# Patient Record
Sex: MALE | Race: NATIVE HAWAIIAN OR OTHER PACIFIC ISLANDER | NOT HISPANIC OR LATINO | Employment: FULL TIME | URBAN - METROPOLITAN AREA
[De-identification: names, ages, dates, MRNs, and addresses within clinical notes are randomized per-mention and may not be internally consistent; named-entity substitution may affect disease eponyms.]

---

## 2022-01-18 ENCOUNTER — TELEPHONE (OUTPATIENT)
Dept: SCHEDULING | Facility: IMAGING CENTER | Age: 52
End: 2022-01-18

## 2022-01-27 ENCOUNTER — OFFICE VISIT (OUTPATIENT)
Dept: MEDICAL GROUP | Facility: PHYSICIAN GROUP | Age: 52
End: 2022-01-27
Payer: COMMERCIAL

## 2022-01-27 VITALS
TEMPERATURE: 98 F | HEART RATE: 63 BPM | HEIGHT: 69 IN | BODY MASS INDEX: 30.07 KG/M2 | SYSTOLIC BLOOD PRESSURE: 118 MMHG | WEIGHT: 203 LBS | RESPIRATION RATE: 20 BRPM | DIASTOLIC BLOOD PRESSURE: 78 MMHG | OXYGEN SATURATION: 96 %

## 2022-01-27 DIAGNOSIS — M25.512 CHRONIC PAIN OF BOTH SHOULDERS: ICD-10-CM

## 2022-01-27 DIAGNOSIS — R06.83 SNORING: ICD-10-CM

## 2022-01-27 DIAGNOSIS — G89.29 CHRONIC PAIN OF BOTH SHOULDERS: ICD-10-CM

## 2022-01-27 DIAGNOSIS — Z76.89 ESTABLISHING CARE WITH NEW DOCTOR, ENCOUNTER FOR: ICD-10-CM

## 2022-01-27 DIAGNOSIS — M25.511 CHRONIC PAIN OF BOTH SHOULDERS: ICD-10-CM

## 2022-01-27 DIAGNOSIS — D75.1 POLYCYTHEMIA: ICD-10-CM

## 2022-01-27 PROCEDURE — 99203 OFFICE O/P NEW LOW 30 MIN: CPT | Performed by: FAMILY MEDICINE

## 2022-01-27 ASSESSMENT — ENCOUNTER SYMPTOMS
SHORTNESS OF BREATH: 0
CHILLS: 0
VOMITING: 0
HEADACHES: 0
DOUBLE VISION: 0
PALPITATIONS: 0
FEVER: 0
SORE THROAT: 0
NAUSEA: 0
MYALGIAS: 0
DIZZINESS: 0
COUGH: 0
BLURRED VISION: 0

## 2022-01-27 ASSESSMENT — PATIENT HEALTH QUESTIONNAIRE - PHQ9: CLINICAL INTERPRETATION OF PHQ2 SCORE: 0

## 2022-01-27 NOTE — LETTER
[email or web address]  Eugene Parisi M.D.  [address]  [place] [address]  Fax: [phone]   Authorization for Release/Disclosure of   Protected Health Information   Name: REY BENSON : 1970 SSN: [id number]   Address: [place] [address] Phone:    [phone] (home) [phone] (work)   I authorize the entity listed below to release/disclose the PHI below to:   [email or web address] M.D. and Eugene Parisi M.D.   Provider or Entity Name:  [facility]    Address   City, [facility], [facility]   Phone:      Fax:     Reason for request: continuity of care   Information to be released:    [  ] LAST COLONOSCOPY,  including any PATH REPORT and follow-up  [  ] LAST FIT/COLOGUARD RESULT [  ] LAST DEXA  [  ] LAST MAMMOGRAM  [  ] LAST PAP  [  ] LAST LABS [  ] RETINA EXAM REPORT  [  ] IMMUNIZATION RECORDS  [  ] Release all info      [  ] Check here and initial the line next to each item to release ALL health information INCLUDING  _____ Care and treatment for drug and / or alcohol abuse  _____ HIV testing, infection status, or AIDS  _____ Genetic Testing    DATES OF SERVICE OR TIME PERIOD TO BE DISCLOSED: _____________  I understand and acknowledge that:  * This Authorization may be revoked at any time by you in writing, except if your health information has already been used or disclosed.  * Your health information that will be used or disclosed as a result of you signing this authorization could be re-disclosed by the recipient. If this occurs, your re-disclosed health information may no longer be protected by State or Federal laws.  * You may refuse to sign this Authorization. Your refusal will not affect your ability to obtain treatment.  * This Authorization becomes effective upon signing and will  on (date) __________.      If no date is indicated, this Authorization will  one (1) year from the signature date.    Name: Rey Benson    Signature:   Date:      1/27/2022       PLEASE FAX REQUESTED RECORDS BACK TO: [phone]

## 2022-01-27 NOTE — ASSESSMENT & PLAN NOTE
New patient to est care  With labs from work eval  hct elevated  Chol elevated    Needs monitoring

## 2022-01-27 NOTE — PROGRESS NOTES
"Subjective:     CC:  Diagnoses of Chronic pain of both shoulders, Polycythemia, Snoring, and Establishing care with new doctor, encounter for were pertinent to this visit.    HISTORY OF THE PRESENT ILLNESS: Patient is a 51 y.o. male. This pleasant patient is here today to establish care and discuss      1) est care  2) STOP BANG 5  3) recent lab work review  4) asthma controlled  5) chronic shoulder pain s/p surgery years ago  Able to gym, doesn't load heavy  Can do pull ups no kip ups          Problem   Chronic Pain of Both Shoulders   Polycythemia   Snoring   Establishing Care With New Doctor, Encounter for   Allergic Rhinitis (Resolved)   Asthma (Resolved)       Current Outpatient Medications Ordered in Epic   Medication Sig Dispense Refill   • fexofenadine (ALLEGRA) 60 MG TABS Take 180 mg by mouth every day.       No current [facility]-ordered facility-administered medications on file.     ROS:   Review of Systems   Constitutional: Negative for chills and fever.   HENT: Negative for ear pain and sore throat.    Eyes: Negative for blurred vision and double vision.   Respiratory: Negative for cough and shortness of breath.    Cardiovascular: Negative for chest pain and palpitations.   Gastrointestinal: Negative for nausea and vomiting.   Genitourinary: Negative for dysuria and hematuria.   Musculoskeletal: Positive for joint pain. Negative for myalgias.   Neurological: Negative for dizziness and headaches.         Objective:     Exam: /78 (BP Location: Left arm, Patient Position: Sitting, BP Cuff Size: Adult)   Pulse 63   Temp 36.7 °C (98 °F) (Temporal)   Resp 20   Ht 1.753 m (5' 9\")   Wt 92.1 kg (203 lb)   SpO2 96%  Body mass index is 29.98 kg/m².    Physical Exam  Constitutional:       General: He is not in acute distress.     Appearance: Normal appearance. He is not ill-appearing or toxic-appearing.   HENT:      Head: Normocephalic and atraumatic.   Eyes:      General: No scleral icterus.        Right " eye: No discharge.         Left eye: No discharge.      Extraocular Movements: Extraocular movements intact.      Conjunctiva/sclera: Conjunctivae normal.      Pupils: Pupils are equal, round, and reactive to light.   Cardiovascular:      Rate and Rhythm: Normal rate and regular rhythm.      Pulses: Normal pulses.      Heart sounds: Normal heart sounds. No murmur heard.      Pulmonary:      Effort: Pulmonary effort is normal. No respiratory distress.      Breath sounds: Normal breath sounds.   Abdominal:      General: There is no distension.      Tenderness: There is no abdominal tenderness.   Musculoskeletal:         General: Normal range of motion.   Neurological:      Mental Status: He is alert.      Coordination: Coordination normal.      Gait: Gait normal.         Assessment & Plan:   51 y.o. male with the following -    Problem List Items Addressed This Visit     Chronic pain of both shoulders     Chronic, R prior surgery  Able to do pull ups but pain returns   Would like to see if body is compensating and specific exercises he should be doing   F/u with sports men         Relevant Orders    Referral to Sports Medicine    Polycythemia     Will repeat and monitor  Likely from melissa?  No T supplements         Relevant Orders    Referral to Pulmonary and Sleep Medicine    Snoring     STOP BANG 5  May be candidate for cpap  Elevated HCT prompted stopbang with hx of snoring  F/u results by sleep med         Relevant Orders    Referral to Pulmonary and Sleep Medicine    Establishing care with new doctor, encounter for     New patient to est care  With labs from work eval  hct elevated  Chol elevated    Needs monitoring             Return in about 1 year (around 1/27/2023), or if symptoms worsen or fail to improve, for f/u labs.    Please note that this dictation was created using voice recognition software. I have made every reasonable attempt to correct obvious errors, but I expect that there are errors of grammar  and possibly content that I did not discover before finalizing the note.

## 2022-01-27 NOTE — ASSESSMENT & PLAN NOTE
STOP BANG 5  May be candidate for cpap  Elevated HCT prompted stopbang with hx of snoring  F/u results by sleep med

## 2022-01-27 NOTE — ASSESSMENT & PLAN NOTE
Chronic, R prior surgery  Able to do pull ups but pain returns   Would like to see if body is compensating and specific exercises he should be doing   F/u with sports men

## 2022-02-08 ENCOUNTER — APPOINTMENT (OUTPATIENT)
Dept: RADIOLOGY | Facility: IMAGING CENTER | Age: 52
End: 2022-02-08
Attending: FAMILY MEDICINE
Payer: COMMERCIAL

## 2022-02-08 ENCOUNTER — OFFICE VISIT (OUTPATIENT)
Dept: SPORTS MEDICINE | Facility: CLINIC | Age: 52
End: 2022-02-08
Payer: COMMERCIAL

## 2022-02-08 VITALS
OXYGEN SATURATION: 93 % | RESPIRATION RATE: 18 BRPM | BODY MASS INDEX: 30.07 KG/M2 | HEIGHT: 69 IN | HEART RATE: 82 BPM | TEMPERATURE: 98.4 F | SYSTOLIC BLOOD PRESSURE: 118 MMHG | DIASTOLIC BLOOD PRESSURE: 74 MMHG | WEIGHT: 203 LBS

## 2022-02-08 DIAGNOSIS — R29.898 CLICKING SHOULDER: ICD-10-CM

## 2022-02-08 DIAGNOSIS — M24.811 INTERNAL DERANGEMENT OF RIGHT SHOULDER: ICD-10-CM

## 2022-02-08 DIAGNOSIS — G89.29 CHRONIC RIGHT SHOULDER PAIN: ICD-10-CM

## 2022-02-08 DIAGNOSIS — M25.511 CHRONIC RIGHT SHOULDER PAIN: ICD-10-CM

## 2022-02-08 DIAGNOSIS — M25.311 INSTABILITY OF RIGHT SHOULDER JOINT: ICD-10-CM

## 2022-02-08 DIAGNOSIS — M24.411 RECURRENT SUBLUXATION OF SHOULDER, RIGHT: ICD-10-CM

## 2022-02-08 PROCEDURE — 99205 OFFICE O/P NEW HI 60 MIN: CPT | Performed by: FAMILY MEDICINE

## 2022-02-08 PROCEDURE — 73030 X-RAY EXAM OF SHOULDER: CPT | Mod: TC,RT | Performed by: RADIOLOGY

## 2022-02-08 NOTE — Clinical Note
Edson Knight,  Thank you for referring Rey to the sports medicine clinic for his shoulder pain.  I am concerned that his labral tear has recurred.  I have ordered a MRI with arthrogram for better assessment of the labrum.  More to come,...  Hope you are well!  L

## 2022-02-08 NOTE — PROGRESS NOTES
Chief Complaint   Patient presents with   • Shoulder Pain     Referral from UC/ R shoulder pain        CHIEF COMPLAINT:  Rey Benson male presenting at the request of Eugene Parisi M.D. for evaluation of Shoulder pain.     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  He has difficulty moving the arm and has to use his contralateral hand to manipulate the RIGHT arm when he feels a sudden sharp painful pop before he can move the shoulder again which can be painful  Worse at night, particularly if he rolls onto his RIGHT side    Active duty   CrossFit, weightlifting, bike riding, surfing, snowboarding, tennis    REVIEW OF SYSTEMS  No Nausea, No Vomiting, No Chest Pain, No Shortness of Breath, No Dizziness, No Headache    PAST MEDICAL HISTORY:   History reviewed. No pertinent past medical history.    PMH:  has a past medical history of Allergic rhinitis (4/7/2011).  MEDS:   Current Outpatient Medications:   •  fexofenadine (ALLEGRA) 60 MG TABS, Take 180 mg by mouth every day., Disp: , Rfl:   ALLERGIES:   Allergies   Allergen Reactions   • Pcn [Penicillins]      SURGHX:   Past Surgical History:   Procedure Laterality Date   • ARTHROPLASTY      Rt shoulder 8/2016   •  "KNEE ARTHROSCOPY      Left     SOCHX:  reports that he has never smoked. He has never used smokeless tobacco. He reports current alcohol use. He reports that he does not use drugs.  FH: Family history was reviewed, no pertinent findings to report     PHYSICAL EXAM:  /74 (BP Location: Left arm, Patient Position: Sitting, BP Cuff Size: Adult)   Pulse 82   Temp 36.9 °C (98.4 °F) (Temporal)   Resp 18   Ht 1.753 m (5' 9\")   Wt 92.1 kg (203 lb)   SpO2 93%   BMI 29.98 kg/m²      well-developed, well-nourished in no apparent distress, alert and oriented x 3.  Gait: normal    Cervical spine:  Range of motion Slightly limited with Lateral rotation and Markedly limited with Extension  Spurling's testing is NEGATIVE but there is some local cervical spine discomfort  Cervical spine tenderness NEGATIVE    Strength testing:     Deltoid, bilateral 5/5  Bicep, bilateral 5/5  Tricep, bilateral 5/5  Wrist Extension, bilateral 5/5  Wrist Flexion, bilateral 5/5  Finger Abduction, bilateral 5/5    Sensation:  INTACT Bilaterally    Reflexes:   Biceps: R 2+/L 2+  Triceps: R 2+/L  2+  Brachial radialis R 2+/L  2+  Bullock's testing is NEGATIVE  The arms are otherwise neurovascularly intact     Shoulder Exam:    RIGHT Shoulder:  No visible swelling   Range of motion DIMINISHED with pain particularly with abduction and internal rotation  Tenderness: Non-tender  Empty Can Testing 5/5 with pain  Internal Rotation 5/5  External Rotation 5/5 with pain  Lift Off Testing, unable to perform on the RIGHT compared to the left with NORMAL 5/5 belly press test  Impingement testing Morales  POSITIVE  Neer's testing POSITIVE  Apprehension testing POSITIVE  Relocation testing POSITIVE  Bhatti's Testing Equivocal  Grind Testing POSITIVE with PAINFUL clicking with posterior shoulder joint compression      LEFT Shoulder:  No visible swelling   Range of motion INTACT  Tenderness: Non-tender  Empty Can Testing 5/5  Internal Rotation 5/5  External " Rotation 5/5  Lift Off Testing 5/5  Impingement testing Morales  NEGATIVE  Neer's testing NEGATIVE  Apprehension testing NEGATIVE  Relocation testing NEGATIVE  Bhatti's Testing NEGATIVE  Grind Testing NEGATIVE    Additional Findings: None    1. Chronic right shoulder pain  DX-SHOULDER 2+ RIGHT    MR-SHOULDER-WITH RIGHT    DX-ARTHROGRAM-SHOULDER RIGHT   2. Instability of right shoulder joint  MR-SHOULDER-WITH RIGHT    DX-ARTHROGRAM-SHOULDER RIGHT   3. Clicking shoulder  MR-SHOULDER-WITH RIGHT    DX-ARTHROGRAM-SHOULDER RIGHT   4. Internal derangement of right shoulder  MR-SHOULDER-WITH RIGHT    DX-ARTHROGRAM-SHOULDER RIGHT   5. Recurrent subluxation of shoulder, right  MR-SHOULDER-WITH RIGHT    DX-ARTHROGRAM-SHOULDER RIGHT     present for 6 month  Pain is at the deltoid region, RTC distribution,and GH joint  Quality is sharp, pressure    He does recall after his 2018 surgery, in 2019 after his recovery he was bench pressing heavy weight (315 pounds) and he had sudden sharp pain in the RIGHT shoulder.  He recovered from that injury without issue and has continued to be active WITHOUT doing any more bench pressing and has only stuck with light weights and light workouts    closed work comp case regarding RIGHT shoulder injury    RIGHT shoulder exam is consistent with a labral tear  Patient did have a labral repair by Dr. Wray back in 2018 (supraspinatus and labrum)  I suspect he may have retorn his labrum given his exam presentation and history of mechanical symptoms at night where he has to physically use his other arm to pull his shoulder into place with a painful pop  It sounds like a recurrent subluxation at the least    Check shoulder x-ray, if normal or nondiagnostic, consider MR arthrogram R shoulder for further evaluation of the labrum    No follow-ups on file.         2/8/2022 4:45 PM     HISTORY/REASON FOR EXAM:  Atraumatic Pain/Swelling/Deformity.  Chronic RIGHT shoulder pain.     TECHNIQUE/EXAM  DESCRIPTION AND NUMBER OF VIEWS:  3 views of the RIGHT shoulder.     COMPARISON: None     FINDINGS:  Clavicle is intact.  AC joint is preserved.  Visualized proximal humerus is intact and normally located.  Visualized RIGHT upper chest is unremarkable.  Minimal osteophyte formation at the margin of the glenoid.     IMPRESSION:     1.  Minimal degenerative change of RIGHT shoulder.  2.  No fracture or dislocation.             Exam Ended: 02/08/22  4:51 PM Last Resulted: 02/08/22  4:54 PM              Interpreted in the office today     Thank you Eugene Parisi M.D. allowing me to participate in caring for your patient    Greater than 60 minutes was spent reviewing patient history, discussing current issue, physical examination, reviewing results, communicating with consulting and prior care providers and documenting the visit.

## 2022-03-15 ASSESSMENT — ENCOUNTER SYMPTOMS: SLEEP DISTURBANCE: 0

## 2022-03-16 ENCOUNTER — OFFICE VISIT (OUTPATIENT)
Dept: SLEEP MEDICINE | Facility: MEDICAL CENTER | Age: 52
End: 2022-03-16
Payer: COMMERCIAL

## 2022-03-16 VITALS
OXYGEN SATURATION: 95 % | HEART RATE: 80 BPM | RESPIRATION RATE: 16 BRPM | SYSTOLIC BLOOD PRESSURE: 122 MMHG | DIASTOLIC BLOOD PRESSURE: 72 MMHG | HEIGHT: 69 IN | WEIGHT: 202.9 LBS | BODY MASS INDEX: 30.05 KG/M2

## 2022-03-16 DIAGNOSIS — G47.30 SLEEP DISORDER BREATHING: ICD-10-CM

## 2022-03-16 PROCEDURE — 99204 OFFICE O/P NEW MOD 45 MIN: CPT | Performed by: FAMILY MEDICINE

## 2022-03-16 RX ORDER — ZOLPIDEM TARTRATE 5 MG/1
5 TABLET ORAL NIGHTLY PRN
Qty: 2 TABLET | Refills: 0 | Status: SHIPPED | OUTPATIENT
Start: 2022-03-16 | End: 2022-03-17

## 2022-03-16 ASSESSMENT — PATIENT HEALTH QUESTIONNAIRE - PHQ9
5. POOR APPETITE OR OVEREATING: 0 - NOT AT ALL
SUM OF ALL RESPONSES TO PHQ QUESTIONS 1-9: 5
CLINICAL INTERPRETATION OF PHQ2 SCORE: 3

## 2022-03-16 NOTE — PROGRESS NOTES
"      [facility]  Consult Note     Date: 3/16/2022 / Time: 2:08 PM    Patient ID:   Name:             Rey Benson     YOB: 1970  Age:                 51 y.o.  male   MRN:               [id number]      Thank you for requesting a sleep medicine consultation on Rey Benson at the sleep center. He presents today with the chief complaints of snoring and pause in breathing during sleep.  He is referred by Dr. Parisi for evaluation and treatment of sleep disorder breathing.     HISTORY OF PRESENT ILLNESS:       Works as a  and works 24-hour shifts. At night,  Rey Benson goes to bed around 9:30-10 pm on weekdays and around 11 pm on the weekends. He gets out of bed at 7 am on weekdays and at 7 am on the weekends.  He averages about 8 hrs of sleep on a good night and 5-6 hrs on a bad night.  The bad nights are on the days he is working overnight.. He falls asleep within 15 minutes.He is aware of snoring and breathing pauses in sleep.  He  denies any symptoms of restless legs syndrome such as an \"urge to move\"  He  legs in the evening or bedtime. He  denies any symptoms of narcolepsy such as sleep paralysis or cataplexy, or any symptoms to suggest parasomnias such as sleep walking or acting out of dreams. He  has not used any medications for the sleep problem.Overall, he does finds his sleep refreshing. In terms of  excessive daytime sleepiness,he denies of sleepiness while  at work,reading or watching TV and while driving. Kramer sleepiness scale score is 8/24.He  rarely takes a regular nap. He drinks about 1-3 caffeinated beverages per day.      REVIEW OF SYSTEMS:       Constitutional: Denies fevers, Denies weight changes  Eyes: Denies changes in vision, no eye pain  Ears/Nose/Throat/Mouth: Denies nasal congestion or sore throat   Cardiovascular: Denies chest pain or palpitations   Respiratory: Denies shortness of breath , Denies cough  Gastrointestinal/Hepatic: Denies " "abdominal pain, nausea, vomiting, diarrhea, constipation or GI bleeding   Genitourinary: Denies bladder dysfunction, dysuria or frequency  Musculoskeletal/Rheum: Denies  joint pain and swelling   Skin/Breast: Denies rash  Neurological: Denies headache, confusion, memory loss or focal weakness/parasthesias  Psychiatric: denies mood disorder     Comprehensive review of systems form is reviewed with the patient and is attached in the EMR.     PMH:  has a past medical history of Allergic rhinitis (4/7/2011), Apnea, sleep, and Snoring.  MEDS:   Current Outpatient Medications:   •  fexofenadine (ALLEGRA) 60 MG TABS, Take 180 mg by mouth every day., Disp: , Rfl:   ALLERGIES:   Allergies   Allergen Reactions   • Pcn [Penicillins]      SURGHX:   Past Surgical History:   Procedure Laterality Date   • ARTHROPLASTY      Rt shoulder 8/2016   • ARTHROSCOPY, KNEE     • KNEE ARTHROSCOPY      Left   • SINUSCOPE       SOCHX:  reports that he has never smoked. He has never used smokeless tobacco. He reports current alcohol use of about 1.2 oz of alcohol per week. He reports that he does not use drugs.   FH:   Family History   Problem Relation Age of Onset   • Diabetes Mother    • Hypertension Mother    • Alzheimer's Disease Mother    • Sleep Apnea Neg Hx        Physical Exam:  Vitals/ General Appearance:   Weight/BMI: Body mass index is 29.96 kg/m².  /72 (BP Location: Left arm, Patient Position: Sitting, BP Cuff Size: Adult)   Pulse 80   Resp 16   Ht 1.753 m (5' 9\")   Wt 92 kg (202 lb 14.4 oz)   SpO2 95%   Vitals:    03/16/22 1408   BP: 122/72   BP Location: Left arm   Patient Position: Sitting   BP Cuff Size: Adult   Pulse: 80   Resp: 16   SpO2: 95%   Weight: 92 kg (202 lb 14.4 oz)   Height: 1.753 m (5' 9\")           Constitutional: Alert, no distress, well-groomed.  Skin: No rashes in visible areas.  Eye: Round. Conjunctiva clear, lids normal. No icterus.   ENMT: Lips pink without lesions, good dentition, moist mucous " membranes. Phonation normal.  Neck: No masses, no thyromegaly. Moves freely without pain.  CV: Pulse as reported by patient  Respiratory: Unlabored respiratory effort, no cough or audible wheeze  Psych: Alert and oriented x3, normal affect and mood.   INVESTIGATIONS:       ASSESSMENT AND PLAN     1. He  has symptoms of Obstructive Sleep Apnea (JOHNSON).    We have discussed diagnostic options including in-laboratory, attended polysomnography and home sleep testing. We have also discussed treatment options including airway pressurization, reconstructive otolaryngologic surgery, dental appliances and weight management.       Subsequently,treatment options will be discussed based on the diagnostic study. Meanwhile, He is urged to avoid supine sleep, weight gain and alcoholic beverages since all of these can worsen JOHNSON. He is cautioned against drowsy driving. If He feels sleepy while driving, He must pull over for a break/nap, rather than persist on the road, in the interest of He own safety and that of others on the road.    Plan  -  He  will be scheduled for an overnight PSG to assess sleep related breathing disorder.    2.  Regarding treatment of other past medical problems and general health maintenance,  He is urged to follow up with PCP.

## 2022-04-12 ENCOUNTER — TELEPHONE (OUTPATIENT)
Dept: MEDICAL GROUP | Facility: PHYSICIAN GROUP | Age: 52
End: 2022-04-12
Payer: COMMERCIAL

## 2022-04-12 DIAGNOSIS — M25.521 RIGHT ELBOW PAIN: ICD-10-CM

## 2022-04-12 RX ORDER — CLINDAMYCIN HYDROCHLORIDE 300 MG/1
300 CAPSULE ORAL 3 TIMES DAILY
Qty: 15 CAPSULE | Refills: 0 | Status: SHIPPED | OUTPATIENT
Start: 2022-04-12 | End: 2022-05-06

## 2022-04-12 NOTE — TELEPHONE ENCOUNTER
VOICEMAIL  1. Caller Name: Rey Benson                      Call Back Number:[phone] (home)     2. Message: Pt called, had a question about his elbow.  Pt stated he had to have a pressure check on the joint capsule in his elbow. He has sutures in his elbow and stated the elbow is very swollen.  He would like to know if PCP will recommend him using a compression wrap on it or just ice it.  Pt stated he has an appt with Eugene Parisi M.D. on Friday Am, but would like to know what to do in the meantime.        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

## 2022-04-13 ENCOUNTER — SLEEP STUDY (OUTPATIENT)
Dept: SLEEP MEDICINE | Facility: MEDICAL CENTER | Age: 52
End: 2022-04-13
Attending: FAMILY MEDICINE
Payer: COMMERCIAL

## 2022-04-13 DIAGNOSIS — G47.30 SLEEP DISORDER BREATHING: ICD-10-CM

## 2022-04-13 PROCEDURE — 95811 POLYSOM 6/>YRS CPAP 4/> PARM: CPT | Performed by: FAMILY MEDICINE

## 2022-04-14 NOTE — PROCEDURES
MONTAGE: Standard  STUDY TYPE: Split Night  RECORDING TECHNIQUE:   After the scalp was prepared, gold plated electrodes were applied to the scalp according to the International 10-20 System. EEG (electroencephalogram) was continuously monitored from the O1-M2, O2-M1, C3-M2, C4-M1, F3-M2, and F4-M1. EOGs (electrooculograms) were monitored by electrodes placed at the left and right outer canthi. Chin EMG (electromyogram) was monitored by electrodes placed on the mentalis and sub-mentalis muscles. Nasal and oral airflow were monitored using a triple port thermocouple as well as oronasal pressure transducer. Respiratory effort was measured by inductive plethysmography technology employing abdominal and thoracic belts. Blood oxygen saturation and pulse were monitored by pulse oximetry. Heart rhythm was monitored by surface electrocardiogram. Leg EMG was studied using surface electrodes placed on left and right anterior tibialis. A microphone was used to monitor tracheal sounds and snoring. Body position was monitored and documented by technician observation.   SCORING CRITERIA:   A modification of the AASM manual for scoring of sleep and associated events was used. Obstructive apneas were scored by cessation of airflow for at least 10 seconds with continuing respiratory effort. Central apneas were scored by cessation of airflow for at least 10 seconds with no respiratory effort. Hypopneas were scored by a 30% or more reduction in airflow for at least 10 seconds accompanied by arterial oxygen desaturation of 3% or an arousal. For CMS (Medicare) patients, per AASM rule 1B, hypopneas are scored by 30% with mild reduction in airflow for at least 10 seconds accompanied by arterial saturation decreased at 4%.  DIAGNOSTIC  Study start time was 09:24:07 PM. Diagnostic recording time was 201 minutes with a total sleep time of 122 minutes resulting in a sleep efficiency of 60.79%%. Sleep latency from the start of the study was 55  minutes and the latency from sleep to REM was 88 minutes. In total,15 arousals were scored for an arousal index of 7.3.  Respiratory:  There were a total of 35 apneas consisting of 35 obstructive apneas, 0 mixed apneas, and 0 central apneas. A total of 90 hypopneas were scored. The apnea index was 17.14 per hour and the hypopnea index was 44.08 per hour resulting in an overall AHI of 61.22. AHI during rem was 75.3 and AHI while supine was 75.29.  Oximetry:  There was a mean oxygen saturation of 91.0%. The minimum oxygen saturation during NREM sleep was80.0% and in REM was 76.0. Time spent during sleep with oxygen saturations <88% was 16.3 minutes.   Cardiac:  The highest heart rate seen while awake was 107 BPM while the highest heart rate during sleep was 69 BPM with an average sleeping heart rate of 52 BPM.  Limb Movements:  There were a total of 193 PLMs during sleep, which resulted in a PLM index of 94.5. There were 10 PLMs associated with arousals which resulted in a PLMS arousal index of 4.9.  TREATMENT:  Treatment recording time was 5h 45.5m (345 minutes) with a total sleep time of 4h 25.0m (265 minutes) resulting in a sleep efficiency of 76.7%. Sleep latency from the start of treatment was 04 minutes and REM latency from sleep onset was 2h 3.5m. The patient had 33 arousals in total for an arousal index of 7.5.  Respiratory:   There were 3 apneas in total consisting of 3 obstructive apneas, 0 central apneas, and 0 mixed apneas for an apnea index of 0.68. The patient had 38 hypopneas in total, which resulted in a hypopnea index of 8.60. The overall AHI was 9.28, with a REM AHI of 11.43, and a supine AHI of 12.14.   Oximetry:  The mean SaO2 during treatment was 92.0%. The minimum oxygen saturation in NREM was86.0 % and in REM was 90.0%. Patient spent 1.5 minutes of TST with SaO2 <88%.  Cardiac:  The highest heart rate during sleep was 65 BPM with an average sleeping heart rate of 52BPM.  Limb Movements:  There  were a total of 94 PLMS during titration sleep time that resulted in an index of 21.3. There were 6 PLMS associated with arousals. This resulted in a PLM arousal index of 1.4.  Titration: CPAP was tried from 6 cm H2O to 10 cm H2O.    CPAP Titration:  The PAP titration was initiated with CPAP 6 cm of water and the pressure which was slowly titrated up in an attempt to eliminate sleep disordered breathing and snoring. The final pressure tested during the study was CPAP 10 cm water. The best tolerated pressure was CPAP 8 cm. At this final pressure the patient was observed in nonsupine REM sleep stage. The apnea hypopnea index improved to 4.57 per hour and O2 danny 87%. The average O2 stauration was 92%. He spent 1 min of sleep time below 88% O2 saturation. Snoring was resolved.The patient utilized medium dreamwear FFM with heated humidification. The PAP was well-tolerated and there were minimal air leaks. Supplemental oxygen was not required.    Impression:  1.  She obstructive sleep apnea with AHI of 61.2/hr and O2 danny 76 %. Due to severity of the disease he met the split study protocol. The titration started with CPAP 6 cm and the best tolerated was CPAP 8 cm. The AHI improved to 4.57/hr with improved O2 danny of 87% and average O2 saturation of 92 %.     Recommendations:  I recommend CPAP 8 cm with medium dreamwear FFM. Recommended 30 day compliance download to assess the efficacy of the recommended pressure and compliance for further outpatient monitoring and management of CPAP therapy. In some cases alternative treatment options may be proven effective in resolving sleep apnea. These options include upper airway surgery, the use of a dental orthotic, weight loss orpositional therapy. Clinical correlation is required. In general patients with sleep apnea are advised to avoid alcohol, sedatives and not to operate a motor vehicle while drowsy.  Untreated sleep apnea increases the risk for cardiovascular and  neurovascular disease.

## 2022-04-15 ENCOUNTER — OFFICE VISIT (OUTPATIENT)
Dept: MEDICAL GROUP | Facility: PHYSICIAN GROUP | Age: 52
End: 2022-04-15
Payer: COMMERCIAL

## 2022-04-15 VITALS
TEMPERATURE: 97 F | BODY MASS INDEX: 30.07 KG/M2 | HEART RATE: 73 BPM | WEIGHT: 203 LBS | RESPIRATION RATE: 16 BRPM | SYSTOLIC BLOOD PRESSURE: 110 MMHG | DIASTOLIC BLOOD PRESSURE: 80 MMHG | HEIGHT: 69 IN | OXYGEN SATURATION: 96 %

## 2022-04-15 DIAGNOSIS — L98.9 SKIN LESION: ICD-10-CM

## 2022-04-15 PROCEDURE — 99212 OFFICE O/P EST SF 10 MIN: CPT | Performed by: FAMILY MEDICINE

## 2022-04-15 ASSESSMENT — ENCOUNTER SYMPTOMS
SENSORY CHANGE: 0
FOCAL WEAKNESS: 0
CHILLS: 0
TINGLING: 0
FEVER: 0

## 2022-04-15 NOTE — PROGRESS NOTES
"Subjective:     CC: skin check, suture removal    HPI:   Rey presents today with     1) day 6 R elbow lac  1 stitch  \"horizontal mattress stitch\"   Swelling and redness improved  clinda worked will    Problem   Skin Lesion       Current Outpatient Medications Ordered in Epic   Medication Sig Dispense Refill   • mupirocin (BACTROBAN) 2 % Ointment Apply 1 Application topically 2 times a day. 22 g 0   • clindamycin (CLEOCIN) 300 MG Cap Take 1 Capsule by mouth 3 times a day. 15 Capsule 0     No current Epic-ordered facility-administered medications on file.     ROS:  Review of Systems   Constitutional: Negative for chills and fever.   Neurological: Negative for tingling, sensory change and focal weakness.       Objective:     Exam:  /80 (BP Location: Left arm, Patient Position: Sitting, BP Cuff Size: Adult)   Pulse 73   Temp 36.1 °C (97 °F) (Temporal)   Resp 16   Ht 1.753 m (5' 9\")   Wt 92.1 kg (203 lb)   SpO2 96%   BMI 29.98 kg/m²  Body mass index is 29.98 kg/m².    Physical Exam  Constitutional:       General: He is not in acute distress.     Appearance: Normal appearance. He is not ill-appearing, toxic-appearing or diaphoretic.   HENT:      Head: Normocephalic and atraumatic.   Eyes:      General: No scleral icterus.        Right eye: No discharge.         Left eye: No discharge.      Extraocular Movements: Extraocular movements intact.      Conjunctiva/sclera: Conjunctivae normal.      Pupils: Pupils are equal, round, and reactive to light.   Musculoskeletal:         General: Tenderness and signs of injury present. No swelling or deformity.   Skin:     Findings: Lesion present.          Neurological:      Mental Status: He is alert.         Assessment & Plan:     51 y.o. male with the following -     Problem List Items Addressed This Visit     Skin lesion     R elbow, healing well,   Topical and po abx x2 days  rtc if concerns         Relevant Medications    mupirocin (BACTROBAN) 2 % Ointment    "     Return if symptoms worsen or fail to improve.    Please note that this dictation was created using voice recognition software. I have made every reasonable attempt to correct obvious errors, but I expect that there are errors of grammar and possibly content that I did not discover before finalizing the note.

## 2022-05-06 ENCOUNTER — OFFICE VISIT (OUTPATIENT)
Dept: SLEEP MEDICINE | Facility: MEDICAL CENTER | Age: 52
End: 2022-05-06
Payer: COMMERCIAL

## 2022-05-06 ENCOUNTER — APPOINTMENT (OUTPATIENT)
Dept: SLEEP MEDICINE | Facility: MEDICAL CENTER | Age: 52
End: 2022-05-06
Payer: COMMERCIAL

## 2022-05-06 VITALS
OXYGEN SATURATION: 96 % | HEART RATE: 85 BPM | HEIGHT: 69 IN | WEIGHT: 202 LBS | BODY MASS INDEX: 29.92 KG/M2 | SYSTOLIC BLOOD PRESSURE: 126 MMHG | DIASTOLIC BLOOD PRESSURE: 72 MMHG

## 2022-05-06 DIAGNOSIS — G47.33 OSA (OBSTRUCTIVE SLEEP APNEA): ICD-10-CM

## 2022-05-06 PROCEDURE — 99213 OFFICE O/P EST LOW 20 MIN: CPT | Performed by: NURSE PRACTITIONER

## 2022-05-06 NOTE — PROGRESS NOTES
Chief Complaint   Patient presents with   • Apnea     JOHNSON-Last seen on 3/16/2022   • Results     sleep study- 4/13/2022       HPI:      Mr. Benson is a 50 y/o male patient who is in today for JOHNSON f/u and sleep study results.  Patient is a  and works 24-hour shifts.  PMH includes polycythemia, JOHNSON, never smoker, chronic bilateral shoulder pain.    He goes to bed between 10:30-11 pm and wakes up at 7-7:30 am. He denies morning headache, but reports snoring.  He does awaken several times so when he sleeps at work but typically does not have trouble going back to sleep.  He does sleep more soundly when at home.  He is planning to retire in October 2022.  He does feel tired during the day and sometimes might have some brain fog or memory issues.  His sleep sometimes also gets disrupted due to shoulder pain for which he did undergo shoulder surgery.  As a  he stays physically active while at work and also tends to work out daily.  He also likes to participate in sports such as mountain biking, dirt biking, snowboarding and surfing.  He denies any new health problems or medications.    Sleep Study History:   PSG split night study from 4/13/22 indicated severe obstructive sleep apnea with AHI of 61.2/hr and O2 danny 76 %. Due to severity of the disease he met the split study protocol. The titration started with CPAP 6 cm and the best tolerated was CPAP 8 cm. The AHI improved to 4.57/hr with improved O2 danny of 87% and average O2 saturation of 92 %. Supplemental oxygen was not required.    ROS:    Constitutional: Denies fevers, Denies weight changes  Eyes: Denies changes in vision, no eye pain  Ears/Nose/Throat/Mouth: Denies nasal congestion or sore throat   Cardiovascular: Denies chest pain or palpitations   Respiratory: Denies shortness of breath , Denies cough  Gastrointestinal/Hepatic: Denies abdominal pain, nausea, vomiting,   Skin/Breast: Denies rash,   Neurological: Denies headache, confusion,    Psychiatric: denies mood disorder   Sleep: + snoring       Past Medical History:   Diagnosis Date   • Allergic rhinitis 4/7/2011   • Apnea, sleep    • Snoring        Past Surgical History:   Procedure Laterality Date   • ARTHROPLASTY      Rt shoulder 8/2016   • ARTHROSCOPY, KNEE     • KNEE ARTHROSCOPY      Left   • SINUSCOPE         Family History   Problem Relation Age of Onset   • Diabetes Mother    • Hypertension Mother    • Alzheimer's Disease Mother    • Sleep Apnea Neg Hx        Social History     Socioeconomic History   • Marital status:      Spouse name: Not on file   • Number of children: Not on file   • Years of education: Not on file   • Highest education level: Not on file   Occupational History   • Not on file   Tobacco Use   • Smoking status: Never Smoker   • Smokeless tobacco: Never Used   Vaping Use   • Vaping Use: Never used   Substance and Sexual Activity   • Alcohol use: Yes     Alcohol/week: 1.2 oz     Types: 1 Glasses of wine, 1 Cans of beer per week     Comment: occasional   • Drug use: No   • Sexual activity: Not on file   Other Topics Concern   • Not on file   Social History Narrative   • Not on file     Social Determinants of Health     Financial Resource Strain: Not on file   Food Insecurity: Not on file   Transportation Needs: Not on file   Physical Activity: Not on file   Stress: Not on file   Social Connections: Not on file   Intimate Partner Violence: Not on file   Housing Stability: Not on file       Allergies as of 05/06/2022 - Reviewed 05/06/2022   Allergen Reaction Noted   • Pcn [penicillins]  02/23/2011        Vitals:  Vitals:    05/06/22 1401   BP: 126/72   Pulse: 85   SpO2: 96%       Current medications as of today   No current outpatient medications on file.     No current facility-administered medications for this visit.         Physical Exam: Limited by COVID-19 precautions.  Appearance: Well developed, well nourished, no acute distress  Eyes: PERRL, EOM intact,  sclera white, conjunctiva moist  Ears: no lesions or deformities  Hearing: grossly intact  Nose: no lesions or deformities  Respiratory effort: no intercostal retractions or use of accessory muscles  Extremities: no cyanosis or edema  Abdomen: soft   Gait and Station: normal  Digits and nails: no clubbing, cyanosis, petechiae or nodes.  Cranial nerves: grossly intact  Skin: no visible rashes, lesions or ulcers noted  Orientation: Oriented to time, person and place  Mood and affect: mood and affect appropriate, normal interaction with examiner  Judgement: Intact    Assessment:  1. JOHNSON (obstructive sleep apnea)  DME CPAP   2. BMI 29.0-29.9,adult           Plan  Discussed the cardiovascular and neuropsychiatric risks of untreated JOHNSON; including but not limited to: HTN, DM, MI, ASCVD, CVA, CHF, traffic accidents.     1.  PSG split night study from 4/13/22 indicated severe obstructive sleep apnea with AHI of 61.2/hr and O2 danny 76 %. Due to severity of the disease he met the split study protocol. The titration started with CPAP 6 cm and the best tolerated was CPAP 8 cm. The AHI improved to 4.57/hr with improved O2 danny of 87% and average O2 saturation of 92 %. Supplemental oxygen was not required.  Recommendation:  Recommend CPAP 8 cm with medium dreamwear FFM.   In some cases alternative treatment options may be proven effective in resolving sleep apnea. These options include upper airway surgery, the use of a dental orthotic, weight loss orpositional therapy.  Patient is advised to avoid the supine position, alcohol 4 hours prior to bedtime, sedatives and opioids as all can exacerbate apnea.    *Patient is amenable to CPAP therapy.  DME order ([facility]) for ResMed CPAP 8 cmH2O, mask (medium dreamwear FFM or MOC) and supplies was placed today. Clean mask and supplies weekly with soap and water, and change supplies per insurance guidelines. Advised patient to use the CPAP every night for more than four hours for optimal  health benefit and to meet the health insurance 70% compliance guideline. Advised patient he may change the mask out if needed within the first 30 days after he receives his equipment.     Patient was informed of [employer] recall and that this may cause a delay up to 5 or 6 months in obtaining a ResMed machine.    2.  Continue to stay active.  If your BMI is 25-29.9 you are overweight. If your BMI is 30 or greater you are obese. To lose weight eat less, move more, or both. Any diet that reduces caloric intake can help with weight loss. Extra weight may reduce your lifespan. Avoid dramatic unsustainable dietary changes that result in the yo-yo effect (down then back up.)  Usually small modifications in diet and exercise are easier to stick with.  3. Sleep hygiene discussed. Recommend keeping a set sleep/wake schedule. Logging enough hours of sleep. Limiting/Avoiding naps. No caffeine after noon and no heavy meals in the evening.   4. Follow up with appropriate healthcare providers for all other medical problems.  5. F/u in 4 months for first compliance, JOHNSON.       RYDER Hall.      This dictation was created using voice recognition software. The accuracy of the dictation is limited to the abilities of the software. I expect there may be some errors of grammar and possibly content.

## 2022-12-09 ENCOUNTER — APPOINTMENT (OUTPATIENT)
Dept: SLEEP MEDICINE | Facility: MEDICAL CENTER | Age: 52
End: 2022-12-09
Payer: COMMERCIAL

## 2023-03-12 SDOH — HEALTH STABILITY: MENTAL HEALTH
STRESS IS WHEN SOMEONE FEELS TENSE, NERVOUS, ANXIOUS, OR CAN'T SLEEP AT NIGHT BECAUSE THEIR MIND IS TROUBLED. HOW STRESSED ARE YOU?: ONLY A LITTLE

## 2023-03-12 SDOH — ECONOMIC STABILITY: HOUSING INSECURITY
IN THE LAST 12 MONTHS, WAS THERE A TIME WHEN YOU DID NOT HAVE A STEADY PLACE TO SLEEP OR SLEPT IN A SHELTER (INCLUDING NOW)?: NO

## 2023-03-12 SDOH — ECONOMIC STABILITY: INCOME INSECURITY: IN THE LAST 12 MONTHS, WAS THERE A TIME WHEN YOU WERE NOT ABLE TO PAY THE MORTGAGE OR RENT ON TIME?: NO

## 2023-03-12 SDOH — ECONOMIC STABILITY: TRANSPORTATION INSECURITY
IN THE PAST 12 MONTHS, HAS THE LACK OF TRANSPORTATION KEPT YOU FROM MEDICAL APPOINTMENTS OR FROM GETTING MEDICATIONS?: NO

## 2023-03-12 SDOH — ECONOMIC STABILITY: FOOD INSECURITY: WITHIN THE PAST 12 MONTHS, THE FOOD YOU BOUGHT JUST DIDN'T LAST AND YOU DIDN'T HAVE MONEY TO GET MORE.: NEVER TRUE

## 2023-03-12 SDOH — ECONOMIC STABILITY: HOUSING INSECURITY: IN THE LAST 12 MONTHS, HOW MANY PLACES HAVE YOU LIVED?: 1

## 2023-03-12 SDOH — ECONOMIC STABILITY: FOOD INSECURITY: WITHIN THE PAST 12 MONTHS, YOU WORRIED THAT YOUR FOOD WOULD RUN OUT BEFORE YOU GOT MONEY TO BUY MORE.: NEVER TRUE

## 2023-03-12 SDOH — HEALTH STABILITY: PHYSICAL HEALTH: ON AVERAGE, HOW MANY MINUTES DO YOU ENGAGE IN EXERCISE AT THIS LEVEL?: 60 MIN

## 2023-03-12 SDOH — ECONOMIC STABILITY: TRANSPORTATION INSECURITY
IN THE PAST 12 MONTHS, HAS LACK OF RELIABLE TRANSPORTATION KEPT YOU FROM MEDICAL APPOINTMENTS, MEETINGS, WORK OR FROM GETTING THINGS NEEDED FOR DAILY LIVING?: NO

## 2023-03-12 SDOH — ECONOMIC STABILITY: INCOME INSECURITY: HOW HARD IS IT FOR YOU TO PAY FOR THE VERY BASICS LIKE FOOD, HOUSING, MEDICAL CARE, AND HEATING?: NOT HARD AT ALL

## 2023-03-12 SDOH — HEALTH STABILITY: PHYSICAL HEALTH: ON AVERAGE, HOW MANY DAYS PER WEEK DO YOU ENGAGE IN MODERATE TO STRENUOUS EXERCISE (LIKE A BRISK WALK)?: 6 DAYS

## 2023-03-12 SDOH — ECONOMIC STABILITY: TRANSPORTATION INSECURITY
IN THE PAST 12 MONTHS, HAS LACK OF TRANSPORTATION KEPT YOU FROM MEETINGS, WORK, OR FROM GETTING THINGS NEEDED FOR DAILY LIVING?: NO

## 2023-03-12 ASSESSMENT — SOCIAL DETERMINANTS OF HEALTH (SDOH)
HOW OFTEN DO YOU GET TOGETHER WITH FRIENDS OR RELATIVES?: MORE THAN THREE TIMES A WEEK
DO YOU BELONG TO ANY CLUBS OR ORGANIZATIONS SUCH AS CHURCH GROUPS UNIONS, FRATERNAL OR ATHLETIC GROUPS, OR SCHOOL GROUPS?: YES
HOW HARD IS IT FOR YOU TO PAY FOR THE VERY BASICS LIKE FOOD, HOUSING, MEDICAL CARE, AND HEATING?: NOT HARD AT ALL
IN A TYPICAL WEEK, HOW MANY TIMES DO YOU TALK ON THE PHONE WITH FAMILY, FRIENDS, OR NEIGHBORS?: MORE THAN THREE TIMES A WEEK
IN A TYPICAL WEEK, HOW MANY TIMES DO YOU TALK ON THE PHONE WITH FAMILY, FRIENDS, OR NEIGHBORS?: MORE THAN THREE TIMES A WEEK
DO YOU BELONG TO ANY CLUBS OR ORGANIZATIONS SUCH AS CHURCH GROUPS UNIONS, FRATERNAL OR ATHLETIC GROUPS, OR SCHOOL GROUPS?: YES
HOW OFTEN DO YOU ATTENT MEETINGS OF THE CLUB OR ORGANIZATION YOU BELONG TO?: MORE THAN 4 TIMES PER YEAR
HOW OFTEN DO YOU HAVE SIX OR MORE DRINKS ON ONE OCCASION: LESS THAN MONTHLY
HOW MANY DRINKS CONTAINING ALCOHOL DO YOU HAVE ON A TYPICAL DAY WHEN YOU ARE DRINKING: 3 OR 4
HOW OFTEN DO YOU ATTEND CHURCH OR RELIGIOUS SERVICES?: NEVER
WITHIN THE PAST 12 MONTHS, YOU WORRIED THAT YOUR FOOD WOULD RUN OUT BEFORE YOU GOT THE MONEY TO BUY MORE: NEVER TRUE
HOW OFTEN DO YOU HAVE A DRINK CONTAINING ALCOHOL: 2-4 TIMES A MONTH
HOW OFTEN DO YOU ATTEND CHURCH OR RELIGIOUS SERVICES?: NEVER
HOW OFTEN DO YOU GET TOGETHER WITH FRIENDS OR RELATIVES?: MORE THAN THREE TIMES A WEEK
HOW OFTEN DO YOU ATTENT MEETINGS OF THE CLUB OR ORGANIZATION YOU BELONG TO?: MORE THAN 4 TIMES PER YEAR

## 2023-03-12 ASSESSMENT — LIFESTYLE VARIABLES
HOW OFTEN DO YOU HAVE SIX OR MORE DRINKS ON ONE OCCASION: LESS THAN MONTHLY
SKIP TO QUESTIONS 9-10: 0
HOW MANY STANDARD DRINKS CONTAINING ALCOHOL DO YOU HAVE ON A TYPICAL DAY: 3 OR 4
HOW OFTEN DO YOU HAVE A DRINK CONTAINING ALCOHOL: 2-4 TIMES A MONTH
AUDIT-C TOTAL SCORE: 4

## 2023-03-15 ENCOUNTER — OFFICE VISIT (OUTPATIENT)
Dept: MEDICAL GROUP | Facility: PHYSICIAN GROUP | Age: 53
End: 2023-03-15
Payer: COMMERCIAL

## 2023-03-15 VITALS
BODY MASS INDEX: 30.66 KG/M2 | SYSTOLIC BLOOD PRESSURE: 122 MMHG | OXYGEN SATURATION: 97 % | HEART RATE: 67 BPM | RESPIRATION RATE: 16 BRPM | DIASTOLIC BLOOD PRESSURE: 60 MMHG | TEMPERATURE: 97.6 F | HEIGHT: 69 IN | WEIGHT: 207 LBS

## 2023-03-15 DIAGNOSIS — Z00.00 HEALTHCARE MAINTENANCE: ICD-10-CM

## 2023-03-15 DIAGNOSIS — G89.29 CHRONIC PAIN OF LEFT KNEE: ICD-10-CM

## 2023-03-15 DIAGNOSIS — Z11.59 ENCOUNTER FOR HEPATITIS C SCREENING TEST FOR LOW RISK PATIENT: ICD-10-CM

## 2023-03-15 DIAGNOSIS — L72.0 EPIDERMOID CYST: ICD-10-CM

## 2023-03-15 DIAGNOSIS — D75.1 POLYCYTHEMIA: ICD-10-CM

## 2023-03-15 DIAGNOSIS — Z01.84 IMMUNITY STATUS TESTING: ICD-10-CM

## 2023-03-15 DIAGNOSIS — M25.562 CHRONIC PAIN OF LEFT KNEE: ICD-10-CM

## 2023-03-15 PROCEDURE — 99214 OFFICE O/P EST MOD 30 MIN: CPT | Performed by: STUDENT IN AN ORGANIZED HEALTH CARE EDUCATION/TRAINING PROGRAM

## 2023-03-15 ASSESSMENT — PATIENT HEALTH QUESTIONNAIRE - PHQ9: CLINICAL INTERPRETATION OF PHQ2 SCORE: 0

## 2023-03-15 NOTE — PROGRESS NOTES
Subjective:     CC:  Diagnoses of Healthcare maintenance, Chronic pain of left knee, Epidermoid cyst, Polycythemia, Encounter for hepatitis C screening test for low risk patient, and Immunity status testing were pertinent to this visit.    HISTORY OF THE PRESENT ILLNESS: Patient is a 52 y.o. male.  This patient is new to me today.  Previously a patient of Venacnio Parisi MD.  This pleasant patient is here today to discuss:    1. Healthcare maintenance  Good exercise regimen.  Regular dentistry.  Diet high in carbohydrates and total calories.    2. Chronic pain of left knee  History of ACL repair in 1982.  Patient continues to have chronic left-sided knee pain.  He is receiving some therapy from a chiropractor.  He feels that this is adequate treatment at this point in time    3. Epidermoid cyst  Located between the scapula with occasional exacerbations.  This will occasionally rupture.  This is not currently exacerbated.    4. Polycythemia  Old asymptomatic laboratory finding.    5. Encounter for hepatitis C screening test for low risk patient  Not at increased risk.    6. Immunity status testing  Patient believes he is fully vaccinated due to his work as a .      Active Diagnosis:    Patient Active Problem List   Diagnosis    Chronic pain of both shoulders    Polycythemia    Snoring    Establishing care with new doctor, encounter for    Skin lesion    Chronic pain of left knee    Epidermoid cyst      Current Outpatient Medications Ordered in Epic   Medication Sig Dispense Refill    Zoster Vac Recomb Adjuvanted (SHINGRIX) 50 MCG/0.5ML Recon Susp Inject 0.5 mL into the shoulder, thigh, or buttocks one time for 1 dose. 0.5 mL 0     No current Epic-ordered facility-administered medications on file.     ROS:   Gen: No fevers/chills, no changes in weight  HEENT: No changes in vision/hearing, sore throat.  Pulm: No cough, unexplained SOB.  CV: No chest pain/pressure, no palpitations  GI: No  "nausea/vomiting, no diarrhea  : No dysuria/nocturia  MSk: No myalgias  Skin: No rash/skin changes  Neuro: No headaches, no numbness/tingling  Heme/Lymph: no easy bruising      Objective:     Exam: /60 (BP Location: Left arm, Patient Position: Sitting, BP Cuff Size: Adult)   Pulse 67   Temp 36.4 °C (97.6 °F) (Temporal)   Resp 16   Ht 1.753 m (5' 9\")   Wt 93.9 kg (207 lb)   SpO2 97%  Body mass index is 30.57 kg/m².    General: Normal appearing. No distress.  HEENT: Normocephalic. Eyes conjunctiva clear lids without ptosis. Pupils equal and reactive to light accommodation. Ears normal shape and contour. Canals are clear bilaterally, tympanic membranes are benign. Nasal mucosa benign, oropharynx is without erythema, edema or exudates.   Neck: Supple without JVD. Thyroid is not enlarged.  Pulmonary: Clear to ausculation.  Normal effort. No rales, ronchi, or wheezing.  Cardiovascular: Regular rate and rhythm without murmur. Radial pulses are intact and equal bilaterally.  Abdomen: Nondistended   neurologic: Grossly nonfocal.  CN II through XII intact.  Lymph: No cervical or supraclavicular lymph nodes are palpable  Skin: Warm and dry.  Small nodule between the scapula a.  No port could be discerned at this point in time.  No overlying erythema or heat.  Musculoskeletal: Normal gait. No extremity cyanosis, clubbing, or edema.  Psych: Normal mood and affect. Alert and oriented x3. Judgment and insight are normal.        Assessment & Plan:   52 y.o. male with the following -    Labs:   1/27/2022:-  TSH 2.24-lipid profile showing total cholesterol 246, triglycerides 84, HDL 71,   - CBC, WNL  - CMP, WNL    1. Healthcare maintenance  -Patient's excellent dentistry and exercise habits were reinforced.  He was urged to cut beverages from sugar with his diet and to  - Comp Metabolic Panel; -Future  - Lipid Profile; Future    2. Chronic pain of left knee  -Chronic, stable.  -Patient would like to continue with " his current therapy.    3. Epidermoid cyst  -Chronic, currently in remission.  -Treatment options were discussed with the patient.    4. Polycythemia  -Unknown status.  - CBC WITHOUT DIFFERENTIAL; Future    5. Encounter for hepatitis C screening test for low risk patient  - HEP C VIRUS ANTIBODY; Future    6. Immunity status testing  - HEP B SURFACE AB; Future      Return in about 1 year (around 3/15/2024).    Please note that this dictation was created using voice recognition software. I have made every reasonable attempt to correct obvious errors, but I expect that there are errors of grammar and possibly content that I did not discover before finalizing the note.      Hiro Soto PA-C 3/15/2023

## 2023-03-27 NOTE — PATIENT INSTRUCTIONS
Sleep Apnea  Sleep apnea is a condition in which breathing pauses or becomes shallow during sleep. Episodes of sleep apnea usually last 10 seconds or longer, and they may occur as many as 20 times an hour. Sleep apnea disrupts your sleep and keeps your body from getting the rest that it needs. This condition can increase your risk of certain health problems, including:  · Heart attack.  · Stroke.  · Obesity.  · Diabetes.  · Heart failure.  · Irregular heartbeat.  What are the causes?  There are three kinds of sleep apnea:  · Obstructive sleep apnea. This kind is caused by a blocked or collapsed airway.  · Central sleep apnea. This kind happens when the part of the brain that controls breathing does not send the correct signals to the muscles that control breathing.  · Mixed sleep apnea. This is a combination of obstructive and central sleep apnea.  The most common cause of this condition is a collapsed or blocked airway. An airway can collapse or become blocked if:  · Your throat muscles are abnormally relaxed.  · Your tongue and tonsils are larger than normal.  · You are overweight.  · Your airway is smaller than normal.  What increases the risk?  You are more likely to develop this condition if you:  · Are overweight.  · Smoke.  · Have a smaller than normal airway.  · Are elderly.  · Are male.  · Drink alcohol.  · Take sedatives or tranquilizers.  · Have a family history of sleep apnea.  What are the signs or symptoms?  Symptoms of this condition include:  · Trouble staying asleep.  · Daytime sleepiness and tiredness.  · Irritability.  · Loud snoring.  · Morning headaches.  · Trouble concentrating.  · Forgetfulness.  · Decreased interest in sex.  · Unexplained sleepiness.  · Mood swings.  · Personality changes.  · Feelings of depression.  · Waking up often during the night to urinate.  · Dry mouth.  · Sore throat.  How is this diagnosed?  This condition may be diagnosed with:  · A medical history.  · A physical  exam.  · A series of tests that are done while you are sleeping (sleep study). These tests are usually done in a sleep lab, but they may also be done at home.  How is this treated?  Treatment for this condition aims to restore normal breathing and to ease symptoms during sleep. It may involve managing health issues that can affect breathing, such as high blood pressure or obesity. Treatment may include:  · Sleeping on your side.  · Using a decongestant if you have nasal congestion.  · Avoiding the use of depressants, including alcohol, sedatives, and narcotics.  · Losing weight if you are overweight.  · Making changes to your diet.  · Quitting smoking.  · Using a device to open your airway while you sleep, such as:  ? An oral appliance. This is a custom-made mouthpiece that shifts your lower jaw forward.  ? A continuous positive airway pressure (CPAP) device. This device blows air through a mask when you breathe out (exhale).  ? A nasal expiratory positive airway pressure (EPAP) device. This device has valves that you put into each nostril.  ? A bi-level positive airway pressure (BPAP) device. This device blows air through a mask when you breathe in (inhale) and breathe out (exhale).  · Having surgery if other treatments do not work. During surgery, excess tissue is removed to create a wider airway.  It is important to get treatment for sleep apnea. Without treatment, this condition can lead to:  · High blood pressure.  · Coronary artery disease.  · In men, an inability to achieve or maintain an erection (impotence).  · Reduced thinking abilities.  Follow these instructions at home:  Lifestyle  · Make any lifestyle changes that your health care provider recommends.  · Eat a healthy, well-balanced diet.  · Take steps to lose weight if you are overweight.  · Avoid using depressants, including alcohol, sedatives, and narcotics.  · Do not use any products that contain nicotine or tobacco, such as cigarettes,  e-cigarettes, and chewing tobacco. If you need help quitting, ask your health care provider.  General instructions  · Take over-the-counter and prescription medicines only as told by your health care provider.  · If you were given a device to open your airway while you sleep, use it only as told by your health care provider.  · If you are having surgery, make sure to tell your health care provider you have sleep apnea. You may need to bring your device with you.  · Keep all follow-up visits as told by your health care provider. This is important.  Contact a health care provider if:  · The device that you received to open your airway during sleep is uncomfortable or does not seem to be working.  · Your symptoms do not improve.  · Your symptoms get worse.  Get help right away if:  · You develop:  ? Chest pain.  ? Shortness of breath.  ? Discomfort in your back, arms, or stomach.  · You have:  ? Trouble speaking.  ? Weakness on one side of your body.  ? Drooping in your face.  These symptoms may represent a serious problem that is an emergency. Do not wait to see if the symptoms will go away. Get medical help right away. Call your local emergency services (911 in the [place] Do not drive yourself to the hospital.  Summary  · Sleep apnea is a condition in which breathing pauses or becomes shallow during sleep.  · The most common cause is a collapsed or blocked airway.  · The goal of treatment is to restore normal breathing and to ease symptoms during sleep.  This information is not intended to replace advice given to you by your health care provider. Make sure you discuss any questions you have with your health care provider.  Document Released: 12/08/2003 Document Revised: 10/04/2019 Document Reviewed: 08/13/2019  Elsevier Patient Education © 2020 [employer]  Zolpidem tablets  What is this medicine?  ZOLPIDEM (zole PI dem) is used to treat insomnia. This medicine helps you to fall asleep and sleep through the  night.  This medicine may be used for other purposes; ask your health care provider or pharmacist if you have questions.  COMMON BRAND NAME(S): Quynh  What should I tell my health care provider before I take this medicine?  They need to know if you have any of these conditions:  · depression  · history of drug abuse or addiction  · if you often drink alcohol  · liver disease  · lung or breathing disease  · myasthenia gravis  · sleep apnea  · sleep-walking, driving, eating or other activity while not fully awake after taking a sleep medicine  · suicidal thoughts, plans, or attempt; a previous suicide attempt by you or a family member  · an unusual or allergic reaction to zolpidem, other medicines, foods, dyes, or preservatives  · pregnant or trying to get pregnant  · breast-feeding  How should I use this medicine?  Take this medicine by mouth with a glass of water. Follow the directions on the prescription label. It is better to take this medicine on an empty stomach and only when you are ready for bed. Do not take your medicine more often than directed. If you have been taking this medicine for several weeks and suddenly stop taking it, you may get unpleasant withdrawal symptoms. Your doctor or health care professional may want to gradually reduce the dose. Do not stop taking this medicine on your own. Always follow your doctor or health care professional's advice.  A special MedGuide will be given to you by the pharmacist with each prescription and refill. Be sure to read this information carefully each time.  Talk to your pediatrician regarding the use of this medicine in children. Special care may be needed.  Overdosage: If you think you have taken too much of this medicine contact a poison control center or emergency room at once.  NOTE: This medicine is only for you. Do not share this medicine with others.  What if I miss a dose?  This does not apply. This medicine should only be taken immediately before going  "to sleep. Do not take double or extra doses.  What may interact with this medicine?  · alcohol  · antihistamines for allergy, cough and cold  · certain medicines for anxiety or sleep  · certain medicines for depression, like amitriptyline, fluoxetine, sertraline  · certain medicines for fungal infections like ketoconazole and itraconazole  · certain medicines for seizures like phenobarbital, primidone  · ciprofloxacin  · dietary supplements for sleep, like valerian or kava kava  · general anesthetics like halothane, isoflurane, methoxyflurane, propofol  · local anesthetics like lidocaine, pramoxine, tetracaine  · medicines that relax muscles for surgery  · narcotic medicines for pain  · phenothiazines like chlorpromazine, mesoridazine, prochlorperazine, thioridazine  · rifampin  This list may not describe all possible interactions. Give your health care provider a list of all the medicines, herbs, non-prescription drugs, or dietary supplements you use. Also tell them if you smoke, drink alcohol, or use illegal drugs. Some items may interact with your medicine.  What should I watch for while using this medicine?  Visit your doctor or health care professional for regular checks on your progress. Keep a regular sleep schedule by going to bed at about the same time each night. Avoid caffeine-containing drinks in the evening hours. When sleep medicines are used every night for more than a few weeks, they may stop working. Talk to your doctor if you still have trouble sleeping.  After taking this medicine, you may get up out of bed and do an activity that you do not know you are doing. The next morning, you may have no memory of this. Activities include driving a car (\"sleep-driving\"), making and eating food, talking on the phone, sexual activity, and sleep-walking. Serious injuries have occurred. Stop the medicine and call your doctor right away if you find out you have done any of these activities. Do not take this " medicine if you have used alcohol that evening. Do not take it if you have taken another medicine for sleep. The risk of doing these sleep-related activities is higher.  Wait for at least 8 hours after you take a dose before driving or doing other activities that require full mental alertness. Do not take this medicine unless you are able to stay in bed for a full night (7 to 8 hours) before you must be active again. You may have a decrease in mental alertness the day after use, even if you feel that you are fully awake. Tell your doctor if you will need to perform activities requiring full alertness, such as driving, the next day. Do not stand or sit up quickly after taking this medicine, especially if you are an older patient. This reduces the risk of dizzy or fainting spells.  If you or your family notice any changes in your behavior, such as new or worsening depression, thoughts of harming yourself, anxiety, other unusual or disturbing thoughts, or memory loss, call your doctor right away.  After you stop taking this medicine, you may have trouble falling asleep. This is called rebound insomnia. This problem usually goes away on its own after 1 or 2 nights.  What side effects may I notice from receiving this medicine?  Side effects that you should report to your doctor or health care professional as soon as possible:  · allergic reactions like skin rash, itching or hives, swelling of the face, lips, or tongue  · breathing problems  · changes in vision  · confusion  · depressed mood or other changes in moods or emotions  · feeling faint or lightheaded, falls  · hallucinations  · loss of balance or coordination  · loss of memory  · numbness or tingling of the tongue  · restlessness, excitability, or feelings of anxiety or agitation  · signs and symptoms of liver injury like dark yellow or brown urine; general ill feeling or flu-like symptoms; light-colored stools; loss of appetite; nausea; right upper belly pain;  unusually weak or tired; yellowing of the eyes or skin  · suicidal thoughts  · unusual activities while not fully awake like driving, eating, making phone calls, or sexual activity  Side effects that usually do not require medical attention (report to your doctor or health care professional if they continue or are bothersome):  · dizziness  · drowsiness the day after you take this medicine  · headache  This list may not describe all possible side effects. Call your doctor for medical advice about side effects. You may report side effects to FDA at 3-055-VOU-7650.  Where should I keep my medicine?  Keep out of the reach of children. This medicine can be abused. Keep your medicine in a safe place to protect it from theft. Do not share this medicine with anyone. Selling or giving away this medicine is dangerous and against the law.  This medicine may cause accidental overdose and death if taken by other adults, children, or pets. Mix any unused medicine with a substance like cat litter or coffee grounds. Then throw the medicine away in a sealed container like a sealed bag or a coffee can with a lid. Do not use the medicine after the expiration date.  Store at room temperature between 20 and 25 degrees C (68 and 77 degrees F).  NOTE: This sheet is a summary. It may not cover all possible information. If you have questions about this medicine, talk to your doctor, pharmacist, or health care provider.  © 2020 [employer] (2019-09-06 11:51:08)     Unna Boot Text: An Unna boot was placed to help immobilize the limb and facilitate more rapid healing.